# Patient Record
(demographics unavailable — no encounter records)

---

## 2025-01-17 NOTE — HISTORY OF PRESENT ILLNESS
[FreeTextEntry1] : Patient is a 59 yo F (OLD/NEW LOV 9/2020) who presents today for breast cancer screening. She has hx of R benign breast excision 10/2005. She has hx of benign R FNA 9/2005.  Endorses family history of father with pancreatic cancer (diagnosed age 60s) and paternal aunt with ovarian cancer (diagnosed age 60s, DOD). Unknown genetic testing for family members.  Previously discussed genetic testing with patient however not performed. Denies family history of breast cancer.  Patient denies palpable masses, skin changes, or nipple discharge bilaterally. Patient states she may have had genetic testing done in 2019 however, does not have records with her. She believes testing was negative. Of note, patient did not have annual breast imaging from 9/2020 -11/2024.  NIMA Lifetime Risk- 12.7%  9/11/2020: B/L MG: Scattered fibroglandular.  R superior stable nodule with circumscribed margins.  R UOQ stable postsurgical skin thickening/retraction and distortion.  BI-RADS 2 11/18/2024: B/L MG: Scattered fibroglandular.  R superior periareolar stable architectural distortion from benign surgical excision.  BERTIN.  BI-RADS 2

## 2025-01-17 NOTE — PHYSICAL EXAM
[Supple] : supple [No Supraclavicular Adenopathy] : no supraclavicular adenopathy [No Cervical Adenopathy] : no cervical adenopathy [No Axillary Lymphadenopathy] : no left axillary lymphadenopathy [de-identified] : Bilateral vs R vs L breast/axilla/supraclavicular area: No masses, discharge, or adenopathy

## 2025-01-17 NOTE — PLAN
[TextEntry] : Reviewed imaging findings and discussed results with patient.  Discussed implication for genetic testing given family history for her and offspring. Patient advised we will request for [facility] to send any copy of genetic testing results for review. She is to return in November 2025 for B/L MG and office visit with reexamination.

## 2025-01-17 NOTE — PLAN
[TextEntry] : Reviewed imaging findings and discussed results with patient.  Discussed implication for genetic testing given family history for her and offspring. Patient advised we will request for [facility] to send any copy of genetic testing results for review. She is to return in November 2025 for B/L MG and office visit with reexamination.

## 2025-01-17 NOTE — HISTORY OF PRESENT ILLNESS
[FreeTextEntry1] : Patient is a 61 yo F (OLD/NEW LOV 9/2020) who presents today for breast cancer screening. She has hx of R benign breast excision 10/2005. She has hx of benign R FNA 9/2005.  Endorses family history of father with pancreatic cancer (diagnosed age 60s) and paternal aunt with ovarian cancer (diagnosed age 60s, DOD). Unknown genetic testing for family members.  Previously discussed genetic testing with patient however not performed. Denies family history of breast cancer.  Patient denies palpable masses, skin changes, or nipple discharge bilaterally. Patient states she may have had genetic testing done in 2019 however, does not have records with her. She believes testing was negative. Of note, patient did not have annual breast imaging from 9/2020 -11/2024.  NIMA Lifetime Risk- 12.7%  9/11/2020: B/L MG: Scattered fibroglandular.  R superior stable nodule with circumscribed margins.  R UOQ stable postsurgical skin thickening/retraction and distortion.  BI-RADS 2 11/18/2024: B/L MG: Scattered fibroglandular.  R superior periareolar stable architectural distortion from benign surgical excision.  BERTIN.  BI-RADS 2

## 2025-01-17 NOTE — FAMILY HISTORY
[TextEntry] : Father with pancreatic cancer (dx age 60s) Paternal aunt with ovarian cancer (dx age 60s, DOD) Denies family history of breast, melanoma and colon cancer

## 2025-01-17 NOTE — PAST MEDICAL HISTORY
[Postmenopausal] : The patient is postmenopausal [Menopause Age____] : age at menopause was [unfilled] [Total Preg ___] : G[unfilled] [Live Births ___] : P[unfilled]  [Full Term ___] : Full Term: [unfilled] [Premature ___] : Premature: [unfilled] [Age At Live Birth ___] : Age at live birth: [unfilled] [History of Hormone Replacement Treatment] : has no history of hormone replacement treatment [FreeTextEntry5] : Na [FreeTextEntry6] : NA [FreeTextEntry7] : Na [FreeTextEntry8] : No

## 2025-01-17 NOTE — PHYSICAL EXAM
[Supple] : supple [No Supraclavicular Adenopathy] : no supraclavicular adenopathy [No Cervical Adenopathy] : no cervical adenopathy [No Axillary Lymphadenopathy] : no left axillary lymphadenopathy [de-identified] : Bilateral vs R vs L breast/axilla/supraclavicular area: No masses, discharge, or adenopathy

## 2025-01-17 NOTE — PHYSICAL EXAM
[Supple] : supple [No Supraclavicular Adenopathy] : no supraclavicular adenopathy [No Cervical Adenopathy] : no cervical adenopathy [No Axillary Lymphadenopathy] : no left axillary lymphadenopathy [de-identified] : Bilateral vs R vs L breast/axilla/supraclavicular area: No masses, discharge, or adenopathy

## 2025-01-17 NOTE — PLAN
[TextEntry] : Reviewed imaging findings and discussed results with patient.  Discussed implication for genetic testing given family history for her and offspring. Patient advised we will request for [employer] to send any copy of genetic testing results for review. She is to return in November 2025 for B/L MG and office visit with reexamination.